# Patient Record
(demographics unavailable — no encounter records)

---

## 2024-11-25 NOTE — ASSESSMENT
[FreeTextEntry1] : Mr. Walden is a 70-year-old male former smoker (quit 17 years ago, 2007) originally from [place], moved to [place] from [place] in 1/2024, with a medical history of COPD, HTN, Shortness of breath on exertion (SOBOE), PNA- abnormal CT of the chest (5 mm RUL nodule)  His shortness of breath is multifactorial due to: -poor mechanics of breathing -out of shape -overweight -pulmonary disease     -COPD     -chronic respiratory failure secondary to COPD -cardiac disease (Dr. Ferris)   Problem 1: COPD -add Symbicort 160 2 inhalations BID -add Spiriva at 2 inhalations QAM  -initiate Ohtuvayre 2.5 BID HHN  -COPD is a progressive disease and although it can't be cured, appropriate management can slow its progression, reduce frequency and severity of exacerbations, improve symptoms, and the patient's quality of life. Hospitalizations are the greatest contributor to the total COPD costs and account for up to 87% of total COPD related costs. Exacerbations are the main cause of admissions and subsequently account for the 40-75% of COPD costs. Inhaled maintenance therapy reduces the incidence of exacerbations in patients with stable COPD. Incorrect inhaler use and nonadherence are major obstacles to achieving COPD treatment goals. Many COPD patients have challenges (impaired inhalation, limited dexterity, reduced cognition) that limit their ability to correctly use their COPD treatment devices resulting in reduced symptom control. Of most importance is smoking cessation, early intervention with respiratory illnesses, and contemplation for pulmonary rehab to enhance quality of life.  -Inhaler technique reviewed as well as oral hygiene techniques reviewed with patient. Avoidance of cold air, extremes of temperature, rescue inhaler should be used before exercise. Order of medication reviewed with patient. Recommended adequate hydration and use of a cool mist humidifier in the bedroom    Problem 2: Chronic Respiratory Failure secondary to COPD -Patient is in a stable state -O2 3 L nasal cannula pulse dose - portable in place (APRIA- Inogen and stationary concentrator) -Tests results (overnight oximetry, 6 minute walk test, exercise study, arterial blood gas, etc.) reveal that oxygen is necessary for daily life- optimally it should be used with any activity and during sleep    Problem 3: abnormal chest CT (5 mm RUL nodule) -next chest CT 1/2025 -follow up with radiology team, Dr. Lubin, adrenal adenoma is present -CAT scans are the only radiological modality to identify abnormalities within the lungs with regards to nodules/masses/lymph nodes. Risks, benefits were reviewed in detail. The guidelines for abnormalities include follow up CT scans at various intervals which could range from 6 weeks to 1 year intervals. If there is a change for the worse then consideration for a biopsy will be considered if the patient is a candidate. Second opinion evaluation with a thoracic surgeon or an interventional radiologist could be offered.    Problem 4: cardiac disease -recommended to follow up with Cardiologist (Dr. Ferris)   Problem 5: poor breathing mechanics -Rx for pulmonary rehab at [facility] 11/2024 -recommended "The Breather" -Recommended Tova Rodríguez breathing technique -Proper breathing techniques were reviewed with an emphasis on exhalation. Patient was instructed to breathe in for 1 second and out for 4 seconds. The patient was encouraged to not talk while walking.   Problem 6: overweight/ out of shape -Weight loss, exercise, and diet control were discussed and are highly encouraged. Treatment options are given such as aqua therapy, and contacting a nutritionist. Recommended to use the elliptical, stationary bike, less use of the treadmill.   Problem 7: health maintenance -s/p yearly flu shot given in office 11/25/2024  -recommended strep pneumonia vaccines: Prevnar-20 vaccine given in office 11/25/2024  -recommended early intervention for Upper Respiratory Infections (URIs) -recommended regular osteoporosis evaluations -recommended early dermatological evaluations -recommended after the age of 50 to the age of 70, colonoscopy every 5 years   F/P in 3-4 months . He is encouraged to call with any changes, concerns, or questions

## 2024-11-25 NOTE — PHYSICAL EXAM
[No Acute Distress] : no acute distress [Normal Oropharynx] : normal oropharynx [III] : Mallampati Class: III [Normal Appearance] : normal appearance [No Neck Mass] : no neck mass [Normal Rate/Rhythm] : normal rate/rhythm [Normal S1, S2] : normal s1, s2 [No Murmurs] : no murmurs [No Resp Distress] : no resp distress [Clear to Auscultation Bilaterally] : clear to auscultation bilaterally [No Abnormalities] : no abnormalities [Benign] : benign [Normal Gait] : normal gait [No Clubbing] : no clubbing [No Cyanosis] : no cyanosis [No Edema] : no edema [FROM] : FROM [Normal Color/ Pigmentation] : normal color/ pigmentation [No Focal Deficits] : no focal deficits [Oriented x3] : oriented x3 [Normal Affect] : normal affect [TextBox_2] : OW [TextBox_68] : I:E ratio 1:3

## 2024-11-25 NOTE — ADDENDUM
[FreeTextEntry1] : Documented by Rosaura Forrest acting as a scribe for Dr. Evaristo Causey on 11/25/2024. All medical record entries made by the Scribe were at my, Dr. Evaristo Causey's, direction and personally dictated by me on 11/25/2024. I have reviewed the chart and agree that the record accurately reflects my personal performance of the history, physical exam, assessment and plan. I have also personally directed, reviewed, and agree with the discharge instructions.

## 2024-11-25 NOTE — HISTORY OF PRESENT ILLNESS
[Former] : former [>= 20 pack years] : >= 20 pack years [TextBox_4] : Mr. Walden is a 70-year-old male former smoker (quit 17 years ago in 2007) originally from [place] with a medical history of COPD, CRF, HTN (noncompliant on medications), Shortness of breath on exertion (SOBOE), & PNA. He presents for a follow-up pulmonary evaluation. His chief complaint is  -he notes his SOB has worsened since his last visit -he notes bowels are regular  -he denies heartburn/reflux -he notes intermittent dysphonia, which he attributes to Trelegy use. He stopped using it because his insurance kept increasing the cost. -he denies having an inhaler at this time, other than albuterol for rescue -he notes he has an O2 tank at home. Using the O2 tank at home doesn't improve his sleep -he notes nebulizing with DuoNeb -he notes he moved to [place] in 1/2024 from [place] -he notes he had a severe URI in 2010  -he denies any headaches, nausea, emesis, fever, chills, sweats, chest pain, chest pressure, coughing, wheezing, palpitations, diarrhea, constipation, dysphagia, vertigo, arthralgias, myalgias, leg swelling, itchy eyes, itchy ears, heartburn, reflux, or sour taste in the mouth. [TextBox_11] : 0.6 [TextBox_13] : 35 [YearQuit] : 2007

## 2024-11-25 NOTE — PROCEDURE
[FreeTextEntry1] : PFTs revealed severe obstructive dysfunction; FEV1 was 0.92L, which is 36% of predicted; normal flow volume loop. PFTs were performed to evaluate for COPD  FENO was 15; a normal value being less than 25 Fractional exhaled nitric oxide (FENO) is regarded as a simple, noninvasive method for assessing eosinophilic airway inflammation. Produced by a variety of cells within the lung, nitric oxide (NO) concentrations are generally low in healthy individuals. However, high concentrations of NO appear to be involved in nonspecific host defense mechanisms and chronic inflammatory diseases such as asthma. The American Thoracic Society (ATS) therefore has recommended using FENO to aid in the diagnosis and monitoring of eosinophilic airway inflammation and asthma, and for identifying steroid responsive individuals whose chronic respiratory symptoms may be caused by airway inflammation.